# Patient Record
Sex: FEMALE | Race: ASIAN | NOT HISPANIC OR LATINO
[De-identification: names, ages, dates, MRNs, and addresses within clinical notes are randomized per-mention and may not be internally consistent; named-entity substitution may affect disease eponyms.]

---

## 2020-01-21 ENCOUNTER — APPOINTMENT (OUTPATIENT)
Dept: OBGYN | Facility: CLINIC | Age: 30
End: 2020-01-21

## 2020-01-24 ENCOUNTER — TRANSCRIPTION ENCOUNTER (OUTPATIENT)
Age: 30
End: 2020-01-24

## 2020-02-20 ENCOUNTER — APPOINTMENT (OUTPATIENT)
Dept: ANTEPARTUM | Facility: CLINIC | Age: 30
End: 2020-02-20
Payer: COMMERCIAL

## 2020-02-20 PROCEDURE — 76811 OB US DETAILED SNGL FETUS: CPT

## 2020-02-20 PROCEDURE — 76817 TRANSVAGINAL US OBSTETRIC: CPT

## 2020-02-27 ENCOUNTER — APPOINTMENT (OUTPATIENT)
Dept: ANTEPARTUM | Facility: CLINIC | Age: 30
End: 2020-02-27
Payer: COMMERCIAL

## 2020-02-27 PROCEDURE — 76817 TRANSVAGINAL US OBSTETRIC: CPT

## 2020-02-27 PROCEDURE — 76815 OB US LIMITED FETUS(S): CPT

## 2020-03-05 ENCOUNTER — APPOINTMENT (OUTPATIENT)
Dept: ANTEPARTUM | Facility: CLINIC | Age: 30
End: 2020-03-05
Payer: COMMERCIAL

## 2020-03-05 PROCEDURE — 76811 OB US DETAILED SNGL FETUS: CPT

## 2020-03-05 PROCEDURE — 76817 TRANSVAGINAL US OBSTETRIC: CPT

## 2020-03-19 ENCOUNTER — APPOINTMENT (OUTPATIENT)
Dept: ANTEPARTUM | Facility: CLINIC | Age: 30
End: 2020-03-19
Payer: COMMERCIAL

## 2020-03-19 PROCEDURE — 0502F SUBSEQUENT PRENATAL CARE: CPT

## 2020-03-25 PROBLEM — Z00.00 ENCOUNTER FOR PREVENTIVE HEALTH EXAMINATION: Status: ACTIVE | Noted: 2020-03-25

## 2020-04-02 ENCOUNTER — APPOINTMENT (OUTPATIENT)
Dept: ANTEPARTUM | Facility: CLINIC | Age: 30
End: 2020-04-02
Payer: COMMERCIAL

## 2020-04-02 PROCEDURE — 76817 TRANSVAGINAL US OBSTETRIC: CPT

## 2020-04-30 ENCOUNTER — APPOINTMENT (OUTPATIENT)
Dept: ANTEPARTUM | Facility: CLINIC | Age: 30
End: 2020-04-30
Payer: COMMERCIAL

## 2020-04-30 PROCEDURE — 76816 OB US FOLLOW-UP PER FETUS: CPT

## 2020-04-30 PROCEDURE — 76819 FETAL BIOPHYS PROFIL W/O NST: CPT

## 2020-05-21 ENCOUNTER — APPOINTMENT (OUTPATIENT)
Dept: ANTEPARTUM | Facility: CLINIC | Age: 30
End: 2020-05-21
Payer: COMMERCIAL

## 2020-05-21 PROCEDURE — 76819 FETAL BIOPHYS PROFIL W/O NST: CPT

## 2020-05-21 PROCEDURE — 76816 OB US FOLLOW-UP PER FETUS: CPT

## 2020-06-04 ENCOUNTER — APPOINTMENT (OUTPATIENT)
Dept: OBGYN | Facility: CLINIC | Age: 30
End: 2020-06-04
Payer: COMMERCIAL

## 2020-06-04 PROCEDURE — 76819 FETAL BIOPHYS PROFIL W/O NST: CPT

## 2020-06-04 PROCEDURE — 76816 OB US FOLLOW-UP PER FETUS: CPT

## 2020-06-11 ENCOUNTER — APPOINTMENT (OUTPATIENT)
Dept: OBGYN | Facility: CLINIC | Age: 30
End: 2020-06-11
Payer: COMMERCIAL

## 2020-06-11 PROCEDURE — 76818 FETAL BIOPHYS PROFILE W/NST: CPT

## 2020-06-18 ENCOUNTER — APPOINTMENT (OUTPATIENT)
Dept: OBGYN | Facility: CLINIC | Age: 30
End: 2020-06-18
Payer: COMMERCIAL

## 2020-06-18 PROCEDURE — 76819 FETAL BIOPHYS PROFIL W/O NST: CPT

## 2020-06-18 PROCEDURE — 76816 OB US FOLLOW-UP PER FETUS: CPT

## 2020-06-27 ENCOUNTER — INPATIENT (INPATIENT)
Facility: HOSPITAL | Age: 30
LOS: 1 days | Discharge: HOME | End: 2020-06-29
Attending: OBSTETRICS & GYNECOLOGY | Admitting: OBSTETRICS & GYNECOLOGY
Payer: COMMERCIAL

## 2020-06-27 ENCOUNTER — APPOINTMENT (OUTPATIENT)
Dept: ANTEPARTUM | Facility: CLINIC | Age: 30
End: 2020-06-27
Payer: COMMERCIAL

## 2020-06-27 VITALS — TEMPERATURE: 99 F | RESPIRATION RATE: 18 BRPM

## 2020-06-27 DIAGNOSIS — O41.00X0 OLIGOHYDRAMNIOS, UNSPECIFIED TRIMESTER, NOT APPLICABLE OR UNSPECIFIED: ICD-10-CM

## 2020-06-27 DIAGNOSIS — Z98.890 OTHER SPECIFIED POSTPROCEDURAL STATES: Chronic | ICD-10-CM

## 2020-06-27 LAB
BASOPHILS # BLD AUTO: 0.02 K/UL — SIGNIFICANT CHANGE UP (ref 0–0.2)
BASOPHILS NFR BLD AUTO: 0.2 % — SIGNIFICANT CHANGE UP (ref 0–2)
BLD GP AB SCN SERPL QL: NEGATIVE — SIGNIFICANT CHANGE UP
EOSINOPHIL # BLD AUTO: 0.15 K/UL — SIGNIFICANT CHANGE UP (ref 0–0.5)
EOSINOPHIL NFR BLD AUTO: 1.5 % — SIGNIFICANT CHANGE UP (ref 0–6)
GLUCOSE BLDC GLUCOMTR-MCNC: 119 MG/DL — HIGH (ref 70–99)
GLUCOSE BLDC GLUCOMTR-MCNC: 78 MG/DL — SIGNIFICANT CHANGE UP (ref 70–99)
GLUCOSE BLDC GLUCOMTR-MCNC: 79 MG/DL — SIGNIFICANT CHANGE UP (ref 70–99)
HBV SURFACE AG SER-ACNC: NEGATIVE — SIGNIFICANT CHANGE UP
HCT VFR BLD CALC: 38 % — SIGNIFICANT CHANGE UP (ref 34.5–45)
HGB BLD-MCNC: 12.7 G/DL — SIGNIFICANT CHANGE UP (ref 11.5–15.5)
HIV COMBO RESULT: SIGNIFICANT CHANGE UP
HIV1+2 AB SPEC QL: SIGNIFICANT CHANGE UP
IMM GRANULOCYTES NFR BLD AUTO: 0.3 % — SIGNIFICANT CHANGE UP (ref 0–1.5)
LYMPHOCYTES # BLD AUTO: 1.76 K/UL — SIGNIFICANT CHANGE UP (ref 1–3.3)
LYMPHOCYTES # BLD AUTO: 17.7 % — SIGNIFICANT CHANGE UP (ref 13–44)
MCHC RBC-ENTMCNC: 29.5 PG — SIGNIFICANT CHANGE UP (ref 27–34)
MCHC RBC-ENTMCNC: 33.4 % — SIGNIFICANT CHANGE UP (ref 32–36)
MCV RBC AUTO: 88.2 FL — SIGNIFICANT CHANGE UP (ref 80–100)
MONOCYTES # BLD AUTO: 0.56 K/UL — SIGNIFICANT CHANGE UP (ref 0–0.9)
MONOCYTES NFR BLD AUTO: 5.6 % — SIGNIFICANT CHANGE UP (ref 2–14)
NEUTROPHILS # BLD AUTO: 7.42 K/UL — HIGH (ref 1.8–7.4)
NEUTROPHILS NFR BLD AUTO: 74.7 % — SIGNIFICANT CHANGE UP (ref 43–77)
NRBC # FLD: 0 K/UL — SIGNIFICANT CHANGE UP (ref 0–0)
PLATELET # BLD AUTO: 186 K/UL — SIGNIFICANT CHANGE UP (ref 150–400)
PMV BLD: 10.3 FL — SIGNIFICANT CHANGE UP (ref 7–13)
RBC # BLD: 4.31 M/UL — SIGNIFICANT CHANGE UP (ref 3.8–5.2)
RBC # FLD: 14 % — SIGNIFICANT CHANGE UP (ref 10.3–14.5)
RH IG SCN BLD-IMP: POSITIVE — SIGNIFICANT CHANGE UP
RH IG SCN BLD-IMP: POSITIVE — SIGNIFICANT CHANGE UP
WBC # BLD: 9.94 K/UL — SIGNIFICANT CHANGE UP (ref 3.8–10.5)
WBC # FLD AUTO: 9.94 K/UL — SIGNIFICANT CHANGE UP (ref 3.8–10.5)

## 2020-06-27 PROCEDURE — 59025 FETAL NON-STRESS TEST: CPT

## 2020-06-27 RX ORDER — SODIUM CHLORIDE 9 MG/ML
1000 INJECTION, SOLUTION INTRAVENOUS
Refills: 0 | Status: DISCONTINUED | OUTPATIENT
Start: 2020-06-27 | End: 2020-06-27

## 2020-06-27 RX ORDER — SODIUM CHLORIDE 9 MG/ML
1000 INJECTION, SOLUTION INTRAVENOUS
Refills: 0 | Status: DISCONTINUED | OUTPATIENT
Start: 2020-06-27 | End: 2020-06-28

## 2020-06-27 RX ORDER — OXYTOCIN 10 UNIT/ML
VIAL (ML) INJECTION
Qty: 20 | Refills: 0 | Status: DISCONTINUED | OUTPATIENT
Start: 2020-06-27 | End: 2020-06-28

## 2020-06-27 RX ORDER — MORPHINE SULFATE 50 MG/1
4 CAPSULE, EXTENDED RELEASE ORAL ONCE
Refills: 0 | Status: DISCONTINUED | OUTPATIENT
Start: 2020-06-27 | End: 2020-06-27

## 2020-06-27 RX ORDER — OXYTOCIN 10 UNIT/ML
2 VIAL (ML) INJECTION
Qty: 30 | Refills: 0 | Status: DISCONTINUED | OUTPATIENT
Start: 2020-06-27 | End: 2020-06-28

## 2020-06-27 RX ORDER — AMPICILLIN TRIHYDRATE 250 MG
2 CAPSULE ORAL ONCE
Refills: 0 | Status: COMPLETED | OUTPATIENT
Start: 2020-06-27 | End: 2020-06-27

## 2020-06-27 RX ORDER — AMPICILLIN TRIHYDRATE 250 MG
1 CAPSULE ORAL EVERY 4 HOURS
Refills: 0 | Status: DISCONTINUED | OUTPATIENT
Start: 2020-06-27 | End: 2020-06-28

## 2020-06-27 RX ADMIN — SODIUM CHLORIDE 125 MILLILITER(S): 9 INJECTION, SOLUTION INTRAVENOUS at 15:37

## 2020-06-27 RX ADMIN — Medication 216 GRAM(S): at 14:24

## 2020-06-27 RX ADMIN — MORPHINE SULFATE 4 MILLIGRAM(S): 50 CAPSULE, EXTENDED RELEASE ORAL at 22:42

## 2020-06-27 RX ADMIN — Medication 108 GRAM(S): at 18:40

## 2020-06-27 RX ADMIN — Medication 2 MILLIUNIT(S)/MIN: at 19:55

## 2020-06-27 RX ADMIN — SODIUM CHLORIDE 125 MILLILITER(S): 9 INJECTION, SOLUTION INTRAVENOUS at 14:23

## 2020-06-27 RX ADMIN — Medication 108 GRAM(S): at 22:38

## 2020-06-27 NOTE — CHART NOTE - NSCHARTNOTEFT_GEN_A_CORE
PGY3 Progress Note    Subjective  Pt reexamined. SVE as below. CB placed without complication. Pt is comfortable. Pt denies any other concerns.    Objective  T(C): 36.9 (06-27-20 @ 15:30), Max: 37.0 (06-27-20 @ 13:01)  HR: 90 (06-27-20 @ 19:34) (72 - 90)  BP: 107/76 (06-27-20 @ 17:48) (107/76 - 121/76)  RR: 15 (06-27-20 @ 15:11) (15 - 18)  SpO2: 98% (06-27-20 @ 19:34) (91% - 99%)  SVE: 3/70/-3  FHT: baseline 145, mod variability, +accels, -decels  [place]: irreg    Assessment  induction of labor for oligo    Plan  start Pitocin for augmentation of labor  epi PRN      Discussed with attending physician, Dr. Dacosta.    Cyndi Mar MD PGY3

## 2020-06-27 NOTE — OB PROVIDER TRIAGE NOTE - HISTORY OF PRESENT ILLNESS
EDC 2020  Dr Astorga    Patient is a 28y/o  @ 38 4/7wks gestation who was sent to triage, for IOL 2' oligohydramnios.  Patient reports mild, intermittent lower back pain/abdominal cramping.  Denies lOF.  Reports good fetal movements.    AP Course:  GDMA2; on Metformin  Meds - pnv & metformin 500mg bid  NKDA

## 2020-06-27 NOTE — OB PROVIDER TRIAGE NOTE - NSOBPROVIDERNOTE_OBGYN_ALL_OB_FT
EDC 2020  Dr Astorga    Patient is a 30y/o  @ 38 4/7wks gestation who was sent to triage, for IOL 2' oligohydramnios.  Patient reports mild, intermittent lower back pain/abdominal cramping.  Denies lOF.  Reports good fetal movements.    AP Course:  GDMA2; on Metformin  Meds - pnv & metformin 500mg bid  NKDA    PMH/PSH/SH - denies  OB - present    Vital Signs   T(C): 37 (2020 13:05), Max: 37.0 (2020 13:01)  T(F): 98.6 (2020 13:05), Max: 98.6 (2020 13:01)  HR: 85 (2020 13:05) (85 - 85)  BP: 120/82 (2020 13:05) (120/82 - 120/82)  RR: 16 (2020 13:05) (16 - 18)    Abdomen gravid, soft and nontender  Continue EFM  SVE - 2.5/60/-3 Intact  Cephalic presentation  GBS - positive; for ampicillin  Clinical EFW -3100G    Discussed patient with Dr Dacosta  PLan:  admit to L&D for management.  For IOL with po cytotec

## 2020-06-27 NOTE — OB PROVIDER TRIAGE NOTE - NSHPPHYSICALEXAM_GEN_ALL_CORE
Vital Signs   T(C): 37 (27 Jun 2020 13:05), Max: 37.0 (27 Jun 2020 13:01)  T(F): 98.6 (27 Jun 2020 13:05), Max: 98.6 (27 Jun 2020 13:01)  HR: 85 (27 Jun 2020 13:05) (85 - 85)  BP: 120/82 (27 Jun 2020 13:05) (120/82 - 120/82)  RR: 16 (27 Jun 2020 13:05) (16 - 18)    Abdomen gravid, soft and nontender  Continue EFM  SVE - 2.5/60/-3 Intact  Cephalic presentation  GBS - positive; for ampicillin  Clinical EFW -3100G

## 2020-06-27 NOTE — OB PROVIDER H&P - ASSESSMENT
Patient is a 30y/o  @ 38 4/7wks gestation with oligohydramnios.  GDMA2; on metformin 500mg bid  GBS - positive; for ampicillin  For IOL with po Cytotec

## 2020-06-27 NOTE — OB PROVIDER H&P - HISTORY OF PRESENT ILLNESS
EDC 2020  Dr Astorga    Patient is a 30y/o  @ 38 4/7wks gestation who was sent to triage, for IOL 2' oligohydramnios.  Patient reports mild, intermittent lower back pain/abdominal cramping.  Denies lOF.  Reports good fetal movements.    AP Course:  GDMA2; on Metformin  Meds - pnv & metformin 500mg bid  NKDA

## 2020-06-28 ENCOUNTER — TRANSCRIPTION ENCOUNTER (OUTPATIENT)
Age: 30
End: 2020-06-28

## 2020-06-28 LAB
GLUCOSE BLDC GLUCOMTR-MCNC: 104 MG/DL — HIGH (ref 70–99)
GLUCOSE BLDC GLUCOMTR-MCNC: 109 MG/DL — HIGH (ref 70–99)
GLUCOSE BLDC GLUCOMTR-MCNC: 119 MG/DL — HIGH (ref 70–99)
RUBV IGG SER-ACNC: 6.9 INDEX — SIGNIFICANT CHANGE UP
RUBV IGG SER-IMP: POSITIVE — SIGNIFICANT CHANGE UP
SARS-COV-2 RNA SPEC QL NAA+PROBE: SIGNIFICANT CHANGE UP
T PALLIDUM AB TITR SER: NEGATIVE — SIGNIFICANT CHANGE UP

## 2020-06-28 PROCEDURE — 59400 OBSTETRICAL CARE: CPT | Mod: U7

## 2020-06-28 RX ORDER — SIMETHICONE 80 MG/1
80 TABLET, CHEWABLE ORAL EVERY 4 HOURS
Refills: 0 | Status: DISCONTINUED | OUTPATIENT
Start: 2020-06-28 | End: 2020-06-29

## 2020-06-28 RX ORDER — ACETAMINOPHEN 500 MG
975 TABLET ORAL
Refills: 0 | Status: DISCONTINUED | OUTPATIENT
Start: 2020-06-28 | End: 2020-06-29

## 2020-06-28 RX ORDER — DIBUCAINE 1 %
1 OINTMENT (GRAM) RECTAL EVERY 6 HOURS
Refills: 0 | Status: DISCONTINUED | OUTPATIENT
Start: 2020-06-28 | End: 2020-06-29

## 2020-06-28 RX ORDER — TETANUS TOXOID, REDUCED DIPHTHERIA TOXOID AND ACELLULAR PERTUSSIS VACCINE, ADSORBED 5; 2.5; 8; 8; 2.5 [IU]/.5ML; [IU]/.5ML; UG/.5ML; UG/.5ML; UG/.5ML
0.5 SUSPENSION INTRAMUSCULAR ONCE
Refills: 0 | Status: DISCONTINUED | OUTPATIENT
Start: 2020-06-28 | End: 2020-06-29

## 2020-06-28 RX ORDER — SODIUM CHLORIDE 9 MG/ML
3 INJECTION INTRAMUSCULAR; INTRAVENOUS; SUBCUTANEOUS EVERY 8 HOURS
Refills: 0 | Status: DISCONTINUED | OUTPATIENT
Start: 2020-06-28 | End: 2020-06-29

## 2020-06-28 RX ORDER — PRAMOXINE HYDROCHLORIDE 150 MG/15G
1 AEROSOL, FOAM RECTAL EVERY 4 HOURS
Refills: 0 | Status: DISCONTINUED | OUTPATIENT
Start: 2020-06-28 | End: 2020-06-29

## 2020-06-28 RX ORDER — AER TRAVELER 0.5 G/1
1 SOLUTION RECTAL; TOPICAL EVERY 4 HOURS
Refills: 0 | Status: DISCONTINUED | OUTPATIENT
Start: 2020-06-28 | End: 2020-06-29

## 2020-06-28 RX ORDER — OXYCODONE HYDROCHLORIDE 5 MG/1
5 TABLET ORAL ONCE
Refills: 0 | Status: DISCONTINUED | OUTPATIENT
Start: 2020-06-28 | End: 2020-06-29

## 2020-06-28 RX ORDER — IBUPROFEN 200 MG
600 TABLET ORAL EVERY 6 HOURS
Refills: 0 | Status: DISCONTINUED | OUTPATIENT
Start: 2020-06-28 | End: 2020-06-29

## 2020-06-28 RX ORDER — OXYCODONE HYDROCHLORIDE 5 MG/1
5 TABLET ORAL
Refills: 0 | Status: DISCONTINUED | OUTPATIENT
Start: 2020-06-28 | End: 2020-06-29

## 2020-06-28 RX ORDER — SODIUM CHLORIDE 9 MG/ML
500 INJECTION, SOLUTION INTRAVENOUS ONCE
Refills: 0 | Status: DISCONTINUED | OUTPATIENT
Start: 2020-06-28 | End: 2020-06-29

## 2020-06-28 RX ORDER — MAGNESIUM HYDROXIDE 400 MG/1
30 TABLET, CHEWABLE ORAL
Refills: 0 | Status: DISCONTINUED | OUTPATIENT
Start: 2020-06-28 | End: 2020-06-29

## 2020-06-28 RX ORDER — IBUPROFEN 200 MG
600 TABLET ORAL EVERY 6 HOURS
Refills: 0 | Status: COMPLETED | OUTPATIENT
Start: 2020-06-28 | End: 2021-05-27

## 2020-06-28 RX ORDER — OXYTOCIN 10 UNIT/ML
6 VIAL (ML) INJECTION
Qty: 30 | Refills: 0 | Status: DISCONTINUED | OUTPATIENT
Start: 2020-06-28 | End: 2020-06-29

## 2020-06-28 RX ORDER — BENZOCAINE 10 %
1 GEL (GRAM) MUCOUS MEMBRANE EVERY 6 HOURS
Refills: 0 | Status: DISCONTINUED | OUTPATIENT
Start: 2020-06-28 | End: 2020-06-29

## 2020-06-28 RX ORDER — KETOROLAC TROMETHAMINE 30 MG/ML
30 SYRINGE (ML) INJECTION ONCE
Refills: 0 | Status: DISCONTINUED | OUTPATIENT
Start: 2020-06-28 | End: 2020-06-28

## 2020-06-28 RX ORDER — DIPHENHYDRAMINE HCL 50 MG
25 CAPSULE ORAL EVERY 6 HOURS
Refills: 0 | Status: DISCONTINUED | OUTPATIENT
Start: 2020-06-28 | End: 2020-06-29

## 2020-06-28 RX ORDER — HYDROCORTISONE 1 %
1 OINTMENT (GRAM) TOPICAL EVERY 6 HOURS
Refills: 0 | Status: DISCONTINUED | OUTPATIENT
Start: 2020-06-28 | End: 2020-06-29

## 2020-06-28 RX ORDER — SODIUM CHLORIDE 9 MG/ML
500 INJECTION INTRAMUSCULAR; INTRAVENOUS; SUBCUTANEOUS ONCE
Refills: 0 | Status: COMPLETED | OUTPATIENT
Start: 2020-06-28 | End: 2020-06-28

## 2020-06-28 RX ORDER — OXYTOCIN 10 UNIT/ML
333.33 VIAL (ML) INJECTION
Qty: 20 | Refills: 0 | Status: DISCONTINUED | OUTPATIENT
Start: 2020-06-28 | End: 2020-06-29

## 2020-06-28 RX ORDER — LANOLIN
1 OINTMENT (GRAM) TOPICAL EVERY 6 HOURS
Refills: 0 | Status: DISCONTINUED | OUTPATIENT
Start: 2020-06-28 | End: 2020-06-29

## 2020-06-28 RX ORDER — SODIUM CHLORIDE 9 MG/ML
1000 INJECTION INTRAMUSCULAR; INTRAVENOUS; SUBCUTANEOUS
Refills: 0 | Status: DISCONTINUED | OUTPATIENT
Start: 2020-06-28 | End: 2020-06-28

## 2020-06-28 RX ADMIN — Medication 6 MILLIUNIT(S)/MIN: at 04:26

## 2020-06-28 RX ADMIN — SODIUM CHLORIDE 3 MILLILITER(S): 9 INJECTION INTRAMUSCULAR; INTRAVENOUS; SUBCUTANEOUS at 14:53

## 2020-06-28 RX ADMIN — Medication 108 GRAM(S): at 06:28

## 2020-06-28 RX ADMIN — SODIUM CHLORIDE 1000 MILLILITER(S): 9 INJECTION INTRAMUSCULAR; INTRAVENOUS; SUBCUTANEOUS at 06:00

## 2020-06-28 RX ADMIN — Medication 108 GRAM(S): at 02:46

## 2020-06-28 RX ADMIN — SODIUM CHLORIDE 3 MILLILITER(S): 9 INJECTION INTRAMUSCULAR; INTRAVENOUS; SUBCUTANEOUS at 21:43

## 2020-06-28 RX ADMIN — Medication 1000 MILLIUNIT(S)/MIN: at 08:39

## 2020-06-28 RX ADMIN — Medication 30 MILLIGRAM(S): at 08:39

## 2020-06-28 RX ADMIN — Medication 600 MILLIGRAM(S): at 15:50

## 2020-06-28 RX ADMIN — Medication 975 MILLIGRAM(S): at 21:44

## 2020-06-28 NOTE — DISCHARGE NOTE OB - CARE PLAN
Principal Discharge DX:	Vacuum extractor delivery  Goal:	recovery  Assessment and plan of treatment:	pelvic rest, regular diet  Secondary Diagnosis:	Gestational diabetes mellitus (GDM) in third trimester, gestational diabetes method of control unspecified

## 2020-06-28 NOTE — OB NEONATOLOGY/PEDIATRICIAN DELIVERY SUMMARY - NSPEDSNEONOTESA_OBGYN_ALL_OB_FT
Called to delivery by Dr. Dacosta for NFRHT and vacuum assisted delivery. 38.4 week male born to a 31 year old , AB+, GBS unknown (treated with ampx2), PNL unremarkable mother. COVID negative. No significant medical/surgical history. Pregnancy significant for GDMA2 on metformin. Mother admitted in labor. AROM 2020 @ 0246 with clear fluids. Terminal meconium and NRFHT noted. Male infant born via vacuum assisted VD with spontaneous cry. W/D/S/S. AGPARs  at 1 and 5 minutes respectively. To WBN for routine care. EOS: 0.05.

## 2020-06-28 NOTE — DISCHARGE NOTE OB - PATIENT PORTAL LINK FT
You can access the FollowMyHealth Patient Portal offered by [facility] by registering at the following website: [email or web address]. By joining [employer]’s FollowMyHealth portal, you will also be able to view your health information using other applications (apps) compatible with our system.

## 2020-06-28 NOTE — DISCHARGE NOTE OB - BREAST MILK PROVIDES PROTECTION AGAINST INFECTION
"Chief Complaint   Patient presents with     Weight Check       Initial Pulse 154  Temp 97.6  F (36.4  C) (Temporal)  Ht 1' 8.5\" (0.521 m)  Wt 6 lb 8.8 oz (2.971 kg)  HC 13.58\" (34.5 cm)  SpO2 99%  BMI 10.96 kg/m2 Estimated body mass index is 10.96 kg/(m^2) as calculated from the following:    Height as of this encounter: 1' 8.5\" (0.521 m).    Weight as of this encounter: 6 lb 8.8 oz (2.971 kg).  Medication Reconciliation: complete       Luz Marina Osorio, [place]    " Statement Selected

## 2020-06-28 NOTE — CHART NOTE - NSCHARTNOTEFT_GEN_A_CORE
Minimal variability and recurrent decels  4-5 cm  Pitocin off  Resuscitation  Discussed  section if no progression  LEO Dacosta

## 2020-06-28 NOTE — OB PROVIDER DELIVERY SUMMARY - NSPROVIDERDELIVERYNOTE_OBGYN_ALL_OB_FT
Vacuum applied after verbal consent given for fetal heart tracing. Vacuum applied after verbal consent given for fetal heart tracing. Vacuum assisted delivery of liveborn infant from OP position. Head, shoulders, and body delivered easily. Infant was suctioned. No mec. Cord was clamped and cut and infant was passed to peds. Placenta delivered intact with a 3 vessel cord. Fundal massage was given and uterine fundus was found to be firm. Vaginal exam revealed an intact cervix, vaginal walls and sulci. LML was repaired with 2-0 chromic and 0 vicryl suture. Excellent hemostasis was noted. Patient was stable. Count was correct x 2.    Alejandra Scott PGY-3

## 2020-06-28 NOTE — CHART NOTE - NSCHARTNOTEFT_GEN_A_CORE
PGY3 Progress Note    Subjective  Pt reexamined. SVE as below. AROM clear. Pt is comfortable. Pt denies any other concerns.    Objective  T(C): 36.9 (06-28-20 @ 02:23), Max: 37.0 (06-27-20 @ 13:01)  HR: 70 (06-28-20 @ 03:13) (67 - 90)  BP: 106/66 (06-28-20 @ 01:48) (106/59 - 134/77)  RR: 15 (06-27-20 @ 15:11) (15 - 18)  SpO2: 100% (06-28-20 @ 03:13) (91% - 100%)  SVE: 4/60/-3  FHT: baseline 140, mod variability, +accels, -decels  [place]: q2-3min    Assessment  induction of labor for oligo    Plan  continue Pitocin for induction  epi PRN    Discussed with attending physician, Dr. Dacosta.    Cyndi Mar MD PGY3

## 2020-06-28 NOTE — CHART NOTE - NSCHARTNOTEFT_GEN_A_CORE
R2 Note    S: Called to evaluate patient after Orthostatics showed jump in HR from 90-140s. Pt is PPD0 from VAVD,  with LML. The patient denies symptoms of lightheadedness, dizziness, chest pain, palpitations, SOB, syncope, heavy vaginal bleeding. Pain well controlled. She is tolerating PO and is drinking fluids. She has ambulated to the bathroom without symptoms.     O:  Vital Signs Last 24 Hrs  T(C): 36.7 (28 Jun 2020 08:16), Max: 37.1 (28 Jun 2020 06:49)  T(F): 98.1 (28 Jun 2020 08:16), Max: 98.78 (28 Jun 2020 06:49)  HR: 85 (28 Jun 2020 11:18) (65 - 131)  BP: 115/74 (28 Jun 2020 09:17) (104/57 - 173/64)  BP(mean): --  RR: 15 (27 Jun 2020 15:11) (15 - 18)  SpO2: 98% (28 Jun 2020 11:18) (86% - 100%)    PE:  Gen: in NAD  CV: regular rate, HR  while in the room  Chest: CTAB  Abd: soft, nontender, mildly distended  Vaginal exam: mild bleeding with fundal pressure; LML palpated and feels in tact with no evidence of hematoma formation.   LE: no erythema/edema    A/P: pt PPD0 s/p VAVD with LML, . Patient now with mild tachycardia, however continues to be completely asymptomatic both lying and standing; patient likely with fluid deficient 2/2 traumatic delivery with LML   -500cc bolus now  -Reeval vitals and orthostatics after IVF    D/w PPeralta PGY3  RFrankel PGY2

## 2020-06-28 NOTE — CHART NOTE - NSCHARTNOTEFT_GEN_A_CORE
FHT with indeterminant baseline and recurrent variable decels.  Went to room RN in room pushing with pt.  I advised RN to stop pushing to establish a baseline and then once baseline established to push with every other contraction.  Dr. Dacosta is present in house.  Will continue to monitor FHT closely.      Caryl Calix MD

## 2020-06-28 NOTE — CHART NOTE - NSCHARTNOTEFT_GEN_A_CORE
R2 Progress Note    Patient seen and examined at bedside for placement of IUPC for amnioinfusion after AROM,     NAD  Vital Signs Last 24 Hrs  T(C): 36.9 (2020 02:23), Max: 37.0 (2020 13:01)  T(F): 98.42 (2020 02:23), Max: 98.6 (2020 13:01)  HR: 77 (2020 02:38) (69 - 90)  BP: 106/66 (2020 01:48) (106/59 - 134/77)  BP(mean): --  RR: 15 (2020 15:11) (15 - 18)  SpO2: 100% (2020 02:38) (91% - 100%)    SVE:4-5/80/-2  EFM:140 , mod jonny, +accel, -decel  TOCO:q2 min    Assesment: 28 y/o  @ 38/4 induction of labor for oligohydramnios now with variable decels after AROM.    Plan   -For amnioinfusion    d/w Dr. Praneeth Norris PGY2

## 2020-06-28 NOTE — DISCHARGE NOTE OB - MEDICATION SUMMARY - MEDICATIONS TO TAKE
I will START or STAY ON the medications listed below when I get home from the hospital:    ibuprofen 600 mg oral tablet  -- 1 tab(s) by mouth every 6 hours, As Needed  -- Indication: For pain    acetaminophen 325 mg oral tablet  -- 3 tab(s) by mouth , As Needed  -- Indication: For pain    Prenatal Multivitamins with Folic Acid 1 mg oral tablet  -- 1 tab(s) by mouth once a day  -- Indication: For Vitamins

## 2020-06-28 NOTE — OB RN DELIVERY SUMMARY - NS_DELIVERYROOM_OBGYN_ALL_OB_FT
Patient has gallon of audrey in room.  States having colonoscopy after cardic cath but on coumadin.  D/w nurse, audrey is old and brought from home.  Her gi workup can be outpatient.  Await cardiac input ? need for psych evaluation.  continue current gi regimen.  No plan for colonosocpy tomorrow for screening. LDR6

## 2020-06-28 NOTE — DISCHARGE NOTE OB - CARE PROVIDER_API CALL
Don Astorga  MATERNAL/FETAL MEDICINE  [address] [address] [place] [address]  Phone: [phone]  Fax: [phone]  Follow Up Time:

## 2020-06-28 NOTE — LACTATION INITIAL EVALUATION - LACTATION INTERVENTIONS
initiate skin to skin/assisted to put baby to rt. breast in football hold position with deep latch and baby noted to suck with stimulation;  pt. is awkward with hand placement and required reminders as to how to place hands

## 2020-06-28 NOTE — CHART NOTE - NSCHARTNOTEFT_GEN_A_CORE
S:    Patient seen and examined for cat 2 tracing     O:   T(C): 36.9 (04:45)  HR: 83 (05:58)  BP: 125/81 (05:58)  RR: 15 (15:11)  SpO2: 98% (05:59)  SVE: 10/100/0  EFM: cat 2 - difficult to determine baseline due to discontinuity  [place]: q1-2min          A/P:   -ISE placed due  -resuscitate with IVF bolus, lateral repositioning, O2   -pause pushing until baseline established and c/w resuscitation at this time       d/w Dr. Praneeth Haile PGY4

## 2020-06-28 NOTE — OB RN DELIVERY SUMMARY - NS_LABORCHARACTER_OBGYN_ALL_OB
Induction of labor-Medicinal/Induction of labor-AROM/External electronic FM/Antibiotics in labor/Internal electronic FM/Meconium staining

## 2020-06-29 VITALS
OXYGEN SATURATION: 100 % | TEMPERATURE: 98 F | HEART RATE: 86 BPM | RESPIRATION RATE: 18 BRPM | DIASTOLIC BLOOD PRESSURE: 78 MMHG | SYSTOLIC BLOOD PRESSURE: 112 MMHG

## 2020-06-29 RX ORDER — METFORMIN HYDROCHLORIDE 850 MG/1
1 TABLET ORAL
Qty: 0 | Refills: 0 | DISCHARGE

## 2020-06-29 RX ORDER — ACETAMINOPHEN 500 MG
3 TABLET ORAL
Qty: 0 | Refills: 0 | DISCHARGE
Start: 2020-06-29

## 2020-06-29 RX ORDER — IBUPROFEN 200 MG
1 TABLET ORAL
Qty: 0 | Refills: 0 | DISCHARGE
Start: 2020-06-29

## 2020-06-29 RX ADMIN — OXYCODONE HYDROCHLORIDE 5 MILLIGRAM(S): 5 TABLET ORAL at 06:04

## 2020-06-29 RX ADMIN — SODIUM CHLORIDE 3 MILLILITER(S): 9 INJECTION INTRAMUSCULAR; INTRAVENOUS; SUBCUTANEOUS at 06:06

## 2020-06-29 RX ADMIN — Medication 600 MILLIGRAM(S): at 02:00

## 2020-06-29 RX ADMIN — Medication 600 MILLIGRAM(S): at 14:33

## 2020-06-29 RX ADMIN — Medication 975 MILLIGRAM(S): at 06:04

## 2020-06-29 RX ADMIN — Medication 1 TABLET(S): at 14:34

## 2020-06-29 NOTE — PROGRESS NOTE ADULT - SUBJECTIVE AND OBJECTIVE BOX
Patient assessed at 0730.  Subjective  Pain: Patient denies any pain at the time of assessment. Pain being managed well by pain management protocol. Patient requesting to be discharge to home today.   Complaints:  None. Patient denies any headache, blur vision and/or dizziness.   Infant feeding:    breastfeeding                             Feeding related issues or concerns: slightly flat nipples but states infant latches on and has been seen by lactation.       Vitals  T(C): 36.6 (20 @ 06:00), Max: 36.9 (20 @ 13:59)  HR: 86 (20 @ 06:00) (76 - 131)  BP: 112/78 (20 @ 06:00) (104/57 - 173/64)  RR: 18 (20 @ 06:00) (16 - 18)  SpO2: 100% (20 @ 06:00) (92% - 100%)  Wt(kg): --        LABS:                          12.7   9.94  )-----------( 186      ( 2020 13:35 )             38.0       Blood Type: AB Positive      Treponema Pallidum Antibody Interpretation: Negative ( @ 13:35)      Rubella IgG Interp: Positive ( @ 16:30)      COVID-19 negative         MEDICATIONS  (STANDING):  acetaminophen   Tablet .. 975 milliGRAM(s) Oral <User Schedule>  diphtheria/tetanus/pertussis (acellular) Vaccine (ADAcel) 0.5 milliLiter(s) IntraMuscular once  ibuprofen  Tablet. 600 milliGRAM(s) Oral every 6 hours  lactated ringers Bolus 500 milliLiter(s) IV Bolus once  prenatal multivitamin 1 Tablet(s) Oral daily  sodium chloride 0.9% lock flush 3 milliLiter(s) IV Push every 8 hours    MEDICATIONS  (PRN):  benzocaine 20%/menthol 0.5% Spray 1 Spray(s) Topical every 6 hours PRN for Perineal discomfort  dibucaine 1% Ointment 1 Application(s) Topical every 6 hours PRN Perineal discomfort  diphenhydrAMINE 25 milliGRAM(s) Oral every 6 hours PRN Pruritus  hydrocortisone 1% Cream 1 Application(s) Topical every 6 hours PRN Moderate Pain (4-6)  lanolin Ointment 1 Application(s) Topical every 6 hours PRN nipple soreness  magnesium hydroxide Suspension 30 milliLiter(s) Oral two times a day PRN Constipation  oxyCODONE    IR 5 milliGRAM(s) Oral every 3 hours PRN Moderate to Severe Pain (4-10)  oxyCODONE    IR 5 milliGRAM(s) Oral once PRN Moderate to Severe Pain (4-10)  pramoxine 1%/zinc 5% Cream 1 Application(s) Topical every 4 hours PRN Moderate Pain (4-6)  simethicone 80 milliGRAM(s) Chew every 4 hours PRN Gas  witch hazel Pads 1 Application(s) Topical every 4 hours PRN Perineal discomfort        30y/o . Day #1 @ 0724  postpartum .  Assisted delivery (vacuum, forceps): vacuum  Indication for assisted delivery: abnormal FHR  Past medical/surgical/OB/GYN history significant for gestational diabetes was on metformin, cervical polyp removed 2018  Current Issues: EBL 400cc  Alert and orientedx3. Out of bed ambulating. Voiding. Tolerating a regular diet.  Lung sound clear bilaterally  Breasts: soft, nontender  Nipples: intact, slightly flat  Abdomen: Soft, nontender, nondistended. Fundus firm. Positive bowel sounds  Vagina: Lochia light rubra  Perineum:  no edema and/or erythema noted  Laceration/episiotomy site: left mediolateral episiotomy   Lower extremities: Slight edema noted bilaterally and equal of lower extremities. Nontender calves.  No erythema noted. Positive pedal pulses. No palpable veins noted.  Other relevant physical exam findings: none          PLAN:  Continue routine postpartum care.   Increase ambulation, analgesia PRN and pain medication protocol standing oxycodone, ibuprofen and acetaminophen.  Continue regular diet  Discussed breast/nipple care. breastfeeding and use of breast pump.   Glucose testing needed in 6weeks for history of gestational diabetes.   Discharge to home today. Discussed discharge planning.

## 2020-06-30 ENCOUNTER — TRANSCRIPTION ENCOUNTER (OUTPATIENT)
Age: 30
End: 2020-06-30

## 2020-07-31 PROBLEM — O24.419 GESTATIONAL DIABETES MELLITUS IN PREGNANCY, UNSPECIFIED CONTROL: Chronic | Status: ACTIVE | Noted: 2020-06-27

## 2020-08-10 ENCOUNTER — APPOINTMENT (OUTPATIENT)
Dept: OBGYN | Facility: CLINIC | Age: 30
End: 2020-08-10
Payer: COMMERCIAL

## 2020-08-10 PROCEDURE — 0503F POSTPARTUM CARE VISIT: CPT

## 2024-02-08 NOTE — OB RN DELIVERY SUMMARY - NSSTERILIZETYPE_OBGYN_ALL_OB
[facility] RADIATION ONCOLOGY CONSULTATION    Patient: Yesica Guerra MRN: [id number]  SSN: [id number]    YOB: 1975  Age: 48 y.o.  Sex: female      Other Providers:  Dr. Sutton, Dr. White    CHIEF COMPLAINT: Abnormal screening mammogram     DIAGNOSIS:  Cancer Staging   Malignant neoplasm of female breast (HCC)  Staging form: Breast, AJCC 8th Edition  - Clinical stage from 1/16/2024: Stage IA (cT1b, cN0(sn), cM0, G1, ER+, VT+, HER2-) - Signed by Chava Sutton MD on 1/16/2024       PREVIOUS RADIATION TREATMENT:  None    HISTORY OF PRESENT ILLNESS:  Yesica Guerra is a 48 y.o. female who I am seeing at the request of Dr. Sutton.     Ms. Narvaez was in her usual state of health until screening mammogram 10/7/23 demonstrated a left asymmetry confirmed on diagnostic mammogram and ultrasound 10/23/23. Biopsy 10/27/23 showed fibrous mastopathy with minimal intraductal hyperplasia and apocrine metaplasia. On 12/26/23 she underwent L breast lumpectomy which showed IDC, low grade, 6mm in greatest dimension, and fibrocystic mastopathy. The lesion was ER+ 74% VT+ 74% HER/ nikolai equivocal negative on FISH. Margins were negative (2mm from closest margin). On 1/9/24 she underwent SLNB which was negative for malignancy. Her Oncotype was 17. She denies pain or residual tenderness or swelling. 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 She has a family history of breast cancer in her mother at age 49.    PAST MEDICAL HISTORY:    Past Medical History:   Diagnosis Date    Asthma     as a child- no issues as adult    Breast cancer (HCC)     INVASIVE DUCTAL CARCINOMA, LOW GRADE Left breast    Fatigue 8/5/2014    chronic    Hematuria, microscopic     recurrent uti's, macrobid post-coital    Mitral valve prolapse 1988    Other ill-defined conditions(549.89)     allergies    Prediabetes     Metformin daily    Psychiatric disorder      None

## 2024-10-12 NOTE — OB RN PATIENT PROFILE - BREASTFEEDING MOTHER TAUGHT HOW TO HAND EXPRESS THEIR OWN MILK
Likely multifactorial in etiology (pulm edema, heart failure, possible thrombotic disease, atelectasis).  Supplement O2 to keep >90%  Treat underlying issues per their respective sections.     Statement Selected

## 2025-03-04 ENCOUNTER — APPOINTMENT (OUTPATIENT)
Dept: OTOLARYNGOLOGY | Facility: CLINIC | Age: 35
End: 2025-03-04
Payer: COMMERCIAL

## 2025-03-04 ENCOUNTER — NON-APPOINTMENT (OUTPATIENT)
Age: 35
End: 2025-03-04

## 2025-03-04 VITALS
WEIGHT: 154 LBS | SYSTOLIC BLOOD PRESSURE: 128 MMHG | HEIGHT: 62 IN | BODY MASS INDEX: 28.34 KG/M2 | HEART RATE: 101 BPM | DIASTOLIC BLOOD PRESSURE: 82 MMHG

## 2025-03-04 DIAGNOSIS — H90.0 CONDUCTIVE HEARING LOSS, BILATERAL: ICD-10-CM

## 2025-03-04 DIAGNOSIS — H93.90 UNSPECIFIED DISORDER OF EAR, UNSPECIFIED EAR: ICD-10-CM

## 2025-03-04 DIAGNOSIS — H93.293 OTHER ABNORMAL AUDITORY PERCEPTIONS, BILATERAL: ICD-10-CM

## 2025-03-04 DIAGNOSIS — H72.03 CENTRAL PERFORATION OF TYMPANIC MEMBRANE, BILATERAL: ICD-10-CM

## 2025-03-04 DIAGNOSIS — Z78.9 OTHER SPECIFIED HEALTH STATUS: ICD-10-CM

## 2025-03-04 PROCEDURE — 99204 OFFICE O/P NEW MOD 45 MIN: CPT

## 2025-03-04 PROCEDURE — 92504 EAR MICROSCOPY EXAMINATION: CPT

## 2025-03-04 RX ORDER — CHROMIUM 200 MCG
TABLET ORAL
Refills: 0 | Status: ACTIVE | COMMUNITY

## 2025-05-12 ENCOUNTER — APPOINTMENT (OUTPATIENT)
Dept: OBGYN | Facility: CLINIC | Age: 35
End: 2025-05-12

## 2025-05-12 VITALS — SYSTOLIC BLOOD PRESSURE: 145 MMHG | WEIGHT: 159 LBS | DIASTOLIC BLOOD PRESSURE: 97 MMHG | BODY MASS INDEX: 29.08 KG/M2

## 2025-05-12 VITALS — DIASTOLIC BLOOD PRESSURE: 90 MMHG | SYSTOLIC BLOOD PRESSURE: 127 MMHG

## 2025-05-12 DIAGNOSIS — O16.9 UNSPECIFIED MATERNAL HYPERTENSION, UNSPECIFIED TRIMESTER: ICD-10-CM

## 2025-05-12 DIAGNOSIS — O09.299 SUPERVISION OF PREGNANCY WITH OTHER POOR REPRODUCTIVE OR OBSTETRIC HISTORY, UNSPECIFIED TRIMESTER: ICD-10-CM

## 2025-05-12 DIAGNOSIS — O21.9 VOMITING OF PREGNANCY, UNSPECIFIED: ICD-10-CM

## 2025-05-12 DIAGNOSIS — Z82.49 FAMILY HISTORY OF ISCHEMIC HEART DISEASE AND OTHER DISEASES OF THE CIRCULATORY SYSTEM: ICD-10-CM

## 2025-05-12 DIAGNOSIS — Z86.32 SUPERVISION OF PREGNANCY WITH OTHER POOR REPRODUCTIVE OR OBSTETRIC HISTORY, UNSPECIFIED TRIMESTER: ICD-10-CM

## 2025-05-12 DIAGNOSIS — Z32.00 ENCOUNTER FOR PREGNANCY TEST, RESULT UNKNOWN: ICD-10-CM

## 2025-05-12 DIAGNOSIS — Z01.419 ENCOUNTER FOR GYNECOLOGICAL EXAMINATION (GENERAL) (ROUTINE) W/OUT ABNORMAL FINDINGS: ICD-10-CM

## 2025-05-12 DIAGNOSIS — Z86.32 PERSONAL HISTORY OF GESTATIONAL DIABETES: ICD-10-CM

## 2025-05-12 DIAGNOSIS — O09.529 SUPERVISION OF ELDERLY MULTIGRAVIDA, UNSPECIFIED TRIMESTER: ICD-10-CM

## 2025-05-12 PROCEDURE — 99385 PREV VISIT NEW AGE 18-39: CPT

## 2025-05-12 PROCEDURE — 36415 COLL VENOUS BLD VENIPUNCTURE: CPT

## 2025-05-12 PROCEDURE — 99459 PELVIC EXAMINATION: CPT | Mod: NC

## 2025-05-12 RX ORDER — ONDANSETRON 4 MG/1
4 TABLET ORAL
Qty: 1 | Refills: 0 | Status: ACTIVE | COMMUNITY
Start: 2025-05-12 | End: 1900-01-01

## 2025-05-12 RX ORDER — DOXYLAMINE SUCCINATE AND PYRIDOXINE HYDROCHLORIDE 10; 10 MG/1; MG/1
10-10 TABLET, DELAYED RELEASE ORAL
Qty: 90 | Refills: 3 | Status: ACTIVE | COMMUNITY
Start: 2025-05-12 | End: 1900-01-01

## 2025-05-12 RX ORDER — METFORMIN HYDROCHLORIDE 500 MG/1
500 TABLET, COATED ORAL
Refills: 0 | Status: ACTIVE | COMMUNITY

## 2025-05-15 LAB
APPEARANCE: ABNORMAL
BACTERIA UR CULT: NORMAL
BACTERIA: NEGATIVE /HPF
BASOPHILS # BLD AUTO: 0.05 K/UL
BASOPHILS NFR BLD AUTO: 0.4 %
BILIRUBIN URINE: NEGATIVE
BLOOD URINE: NEGATIVE
C TRACH RRNA SPEC QL NAA+PROBE: NOT DETECTED
CAST: 0 /LPF
COLOR: YELLOW
EOSINOPHIL # BLD AUTO: 0.68 K/UL
EOSINOPHIL NFR BLD AUTO: 4.9 %
EPITHELIAL CELLS: 0 /HPF
ESTIMATED AVERAGE GLUCOSE: 103 MG/DL
GLUCOSE QUALITATIVE U: NEGATIVE MG/DL
GLUCOSE SERPL-MCNC: 82 MG/DL
HBA1C MFR BLD HPLC: 5.2 %
HBV CORE IGG+IGM SER QL: NONREACTIVE
HBV SURFACE AB SER QL: REACTIVE
HBV SURFACE AG SER QL: NONREACTIVE
HCT VFR BLD CALC: 40.4 %
HCV AB SER QL: NONREACTIVE
HCV S/CO RATIO: 0.45 S/CO
HGB BLD-MCNC: 13.3 G/DL
HIV1+2 AB SPEC QL IA.RAPID: NONREACTIVE
IMM GRANULOCYTES NFR BLD AUTO: 0.3 %
KETONES URINE: NEGATIVE MG/DL
LEAD BLD-MCNC: <1 UG/DL
LEUKOCYTE ESTERASE URINE: NEGATIVE
LYMPHOCYTES # BLD AUTO: 2.88 K/UL
LYMPHOCYTES NFR BLD AUTO: 20.9 %
M TB IFN-G BLD-IMP: NEGATIVE
MAN DIFF?: NORMAL
MCHC RBC-ENTMCNC: 29.8 PG
MCHC RBC-ENTMCNC: 32.9 G/DL
MCV RBC AUTO: 90.4 FL
MEV IGG FLD QL IA: >300 AU/ML
MEV IGG+IGM SER-IMP: POSITIVE
MICROSCOPIC-UA: NORMAL
MONOCYTES # BLD AUTO: 0.68 K/UL
MONOCYTES NFR BLD AUTO: 4.9 %
MUV AB SER-ACNC: POSITIVE
MUV IGG SER QL IA: 22.6 AU/ML
N GONORRHOEA RRNA SPEC QL NAA+PROBE: NOT DETECTED
NEUTROPHILS # BLD AUTO: 9.43 K/UL
NEUTROPHILS NFR BLD AUTO: 68.6 %
NITRITE URINE: NEGATIVE
PH URINE: 7.5
PLATELET # BLD AUTO: 257 K/UL
PROTEIN URINE: NEGATIVE MG/DL
QUANTIFERON TB PLUS MITOGEN MINUS NIL: >10 IU/ML
QUANTIFERON TB PLUS NIL: 0.11 IU/ML
QUANTIFERON TB PLUS TB1 MINUS NIL: 0.1 IU/ML
QUANTIFERON TB PLUS TB2 MINUS NIL: 0.06 IU/ML
RBC # BLD: 4.47 M/UL
RBC # FLD: 13.3 %
RED BLOOD CELLS URINE: 1 /HPF
RUBV IGG FLD-ACNC: 8.08 INDEX
RUBV IGG SER-IMP: POSITIVE
SOURCE AMPLIFICATION: NORMAL
SPECIFIC GRAVITY URINE: 1.01
T PALLIDUM AB SER QL IA: NEGATIVE
T4 FREE SERPL-MCNC: 1.2 NG/DL
TSH SERPL-ACNC: 1.47 UIU/ML
UROBILINOGEN URINE: 0.2 MG/DL
WBC # FLD AUTO: 13.76 K/UL
WHITE BLOOD CELLS URINE: 1 /HPF

## 2025-05-16 LAB — CYTOLOGY CVX/VAG DOC THIN PREP: NORMAL

## 2025-06-02 ENCOUNTER — APPOINTMENT (OUTPATIENT)
Dept: OBGYN | Facility: CLINIC | Age: 35
End: 2025-06-02

## 2025-06-02 ENCOUNTER — APPOINTMENT (OUTPATIENT)
Dept: ANTEPARTUM | Facility: CLINIC | Age: 35
End: 2025-06-02
Payer: COMMERCIAL

## 2025-06-02 ENCOUNTER — ASOB RESULT (OUTPATIENT)
Age: 35
End: 2025-06-02

## 2025-06-02 VITALS
DIASTOLIC BLOOD PRESSURE: 92 MMHG | WEIGHT: 163 LBS | HEIGHT: 62 IN | BODY MASS INDEX: 30 KG/M2 | SYSTOLIC BLOOD PRESSURE: 152 MMHG

## 2025-06-02 VITALS — SYSTOLIC BLOOD PRESSURE: 145 MMHG | DIASTOLIC BLOOD PRESSURE: 88 MMHG

## 2025-06-02 PROBLEM — O02.1 MISSED ABORTION: Status: ACTIVE | Noted: 2025-06-02

## 2025-06-02 PROCEDURE — 76817 TRANSVAGINAL US OBSTETRIC: CPT

## 2025-06-02 PROCEDURE — 36415 COLL VENOUS BLD VENIPUNCTURE: CPT

## 2025-06-02 PROCEDURE — 76815 OB US LIMITED FETUS(S): CPT

## 2025-06-02 PROCEDURE — 99213 OFFICE O/P EST LOW 20 MIN: CPT

## 2025-06-04 ENCOUNTER — APPOINTMENT (OUTPATIENT)
Dept: OBGYN | Facility: CLINIC | Age: 35
End: 2025-06-04
Payer: COMMERCIAL

## 2025-06-04 DIAGNOSIS — Z00.00 ENCOUNTER FOR GENERAL ADULT MEDICAL EXAMINATION W/OUT ABNORMAL FINDINGS: ICD-10-CM

## 2025-06-04 PROCEDURE — 36415 COLL VENOUS BLD VENIPUNCTURE: CPT

## 2025-06-08 LAB
HCG SERPL-MCNC: ABNORMAL MIU/ML
HCG SERPL-MCNC: ABNORMAL MIU/ML

## 2025-06-09 ENCOUNTER — APPOINTMENT (OUTPATIENT)
Dept: ANTEPARTUM | Facility: CLINIC | Age: 35
End: 2025-06-09

## 2025-06-09 ENCOUNTER — APPOINTMENT (OUTPATIENT)
Dept: OBGYN | Facility: CLINIC | Age: 35
End: 2025-06-09
Payer: COMMERCIAL

## 2025-06-09 VITALS — WEIGHT: 165 LBS | SYSTOLIC BLOOD PRESSURE: 132 MMHG | BODY MASS INDEX: 30.18 KG/M2 | DIASTOLIC BLOOD PRESSURE: 84 MMHG

## 2025-06-09 PROCEDURE — 76801 OB US < 14 WKS SINGLE FETUS: CPT

## 2025-06-09 PROCEDURE — 36415 COLL VENOUS BLD VENIPUNCTURE: CPT

## 2025-06-09 PROCEDURE — 99213 OFFICE O/P EST LOW 20 MIN: CPT

## 2025-06-11 LAB
ABORH: NORMAL
ANTIBODY SCREEN: NORMAL
BASOPHILS # BLD AUTO: 0.04 K/UL
BASOPHILS NFR BLD AUTO: 0.4 %
EOSINOPHIL # BLD AUTO: 0.72 K/UL
EOSINOPHIL NFR BLD AUTO: 6.8 %
HCG SERPL-MCNC: 5829 MIU/ML
HCT VFR BLD CALC: 38.8 %
HGB BLD-MCNC: 13.2 G/DL
IMM GRANULOCYTES NFR BLD AUTO: 0.3 %
LYMPHOCYTES # BLD AUTO: 2.73 K/UL
LYMPHOCYTES NFR BLD AUTO: 25.7 %
MAN DIFF?: NORMAL
MCHC RBC-ENTMCNC: 30.5 PG
MCHC RBC-ENTMCNC: 34 G/DL
MCV RBC AUTO: 89.6 FL
MONOCYTES # BLD AUTO: 0.55 K/UL
MONOCYTES NFR BLD AUTO: 5.2 %
NEUTROPHILS # BLD AUTO: 6.56 K/UL
NEUTROPHILS NFR BLD AUTO: 61.6 %
PLATELET # BLD AUTO: 227 K/UL
RBC # BLD: 4.33 M/UL
RBC # FLD: 12.8 %
WBC # FLD AUTO: 10.63 K/UL

## 2025-06-13 ENCOUNTER — OUTPATIENT (OUTPATIENT)
Dept: OUTPATIENT SERVICES | Facility: HOSPITAL | Age: 35
LOS: 1 days | End: 2025-06-13

## 2025-06-13 ENCOUNTER — EMERGENCY (EMERGENCY)
Facility: HOSPITAL | Age: 35
LOS: 1 days | End: 2025-06-13
Attending: EMERGENCY MEDICINE | Admitting: EMERGENCY MEDICINE
Payer: COMMERCIAL

## 2025-06-13 ENCOUNTER — RESULT REVIEW (OUTPATIENT)
Age: 35
End: 2025-06-13

## 2025-06-13 VITALS
SYSTOLIC BLOOD PRESSURE: 116 MMHG | RESPIRATION RATE: 16 BRPM | DIASTOLIC BLOOD PRESSURE: 71 MMHG | HEART RATE: 91 BPM | TEMPERATURE: 98 F | OXYGEN SATURATION: 100 %

## 2025-06-13 VITALS
HEART RATE: 88 BPM | DIASTOLIC BLOOD PRESSURE: 75 MMHG | SYSTOLIC BLOOD PRESSURE: 104 MMHG | OXYGEN SATURATION: 100 % | HEIGHT: 61 IN | TEMPERATURE: 98 F | WEIGHT: 156.09 LBS | RESPIRATION RATE: 15 BRPM

## 2025-06-13 VITALS
HEIGHT: 61 IN | DIASTOLIC BLOOD PRESSURE: 89 MMHG | RESPIRATION RATE: 16 BRPM | OXYGEN SATURATION: 99 % | WEIGHT: 156.09 LBS | SYSTOLIC BLOOD PRESSURE: 136 MMHG | HEART RATE: 85 BPM | TEMPERATURE: 98 F

## 2025-06-13 DIAGNOSIS — Z98.890 OTHER SPECIFIED POSTPROCEDURAL STATES: Chronic | ICD-10-CM

## 2025-06-13 DIAGNOSIS — O02.1 MISSED ABORTION: ICD-10-CM

## 2025-06-13 PROBLEM — O24.419 GESTATIONAL DIABETES MELLITUS IN PREGNANCY, UNSPECIFIED CONTROL: Chronic | Status: INACTIVE | Noted: 2020-06-27 | Resolved: 2025-06-13

## 2025-06-13 LAB
ALBUMIN SERPL ELPH-MCNC: 3.6 G/DL — SIGNIFICANT CHANGE UP (ref 3.3–5)
ALP SERPL-CCNC: 49 U/L — SIGNIFICANT CHANGE UP (ref 40–120)
ALT FLD-CCNC: 11 U/L — SIGNIFICANT CHANGE UP (ref 4–33)
ANION GAP SERPL CALC-SCNC: 12 MMOL/L — SIGNIFICANT CHANGE UP (ref 7–14)
APTT BLD: 29.6 SEC — SIGNIFICANT CHANGE UP (ref 26.1–36.8)
AST SERPL-CCNC: 14 U/L — SIGNIFICANT CHANGE UP (ref 4–32)
BASOPHILS # BLD AUTO: 0.04 K/UL — SIGNIFICANT CHANGE UP (ref 0–0.2)
BASOPHILS # BLD AUTO: 0.05 K/UL — SIGNIFICANT CHANGE UP (ref 0–0.2)
BASOPHILS NFR BLD AUTO: 0.2 % — SIGNIFICANT CHANGE UP (ref 0–2)
BASOPHILS NFR BLD AUTO: 0.5 % — SIGNIFICANT CHANGE UP (ref 0–2)
BILIRUB SERPL-MCNC: <0.2 MG/DL — SIGNIFICANT CHANGE UP (ref 0.2–1.2)
BLD GP AB SCN SERPL QL: NEGATIVE — SIGNIFICANT CHANGE UP
BLD GP AB SCN SERPL QL: NEGATIVE — SIGNIFICANT CHANGE UP
BUN SERPL-MCNC: 8 MG/DL — SIGNIFICANT CHANGE UP (ref 7–23)
CALCIUM SERPL-MCNC: 8.7 MG/DL — SIGNIFICANT CHANGE UP (ref 8.4–10.5)
CHLORIDE SERPL-SCNC: 107 MMOL/L — SIGNIFICANT CHANGE UP (ref 98–107)
CO2 SERPL-SCNC: 20 MMOL/L — LOW (ref 22–31)
CREAT SERPL-MCNC: 0.54 MG/DL — SIGNIFICANT CHANGE UP (ref 0.5–1.3)
EGFR: 122 ML/MIN/1.73M2 — SIGNIFICANT CHANGE UP
EGFR: 122 ML/MIN/1.73M2 — SIGNIFICANT CHANGE UP
EOSINOPHIL # BLD AUTO: 0.27 K/UL — SIGNIFICANT CHANGE UP (ref 0–0.5)
EOSINOPHIL # BLD AUTO: 0.6 K/UL — HIGH (ref 0–0.5)
EOSINOPHIL NFR BLD AUTO: 1.6 % — SIGNIFICANT CHANGE UP (ref 0–6)
EOSINOPHIL NFR BLD AUTO: 5.6 % — SIGNIFICANT CHANGE UP (ref 0–6)
GLUCOSE SERPL-MCNC: 116 MG/DL — HIGH (ref 70–99)
HCG SERPL-ACNC: 1876 MIU/ML — SIGNIFICANT CHANGE UP
HCT VFR BLD CALC: 28.7 % — LOW (ref 34.5–45)
HCT VFR BLD CALC: 31.5 % — LOW (ref 34.5–45)
HCT VFR BLD CALC: 39.2 % — SIGNIFICANT CHANGE UP (ref 34.5–45)
HGB BLD-MCNC: 10.7 G/DL — LOW (ref 11.5–15.5)
HGB BLD-MCNC: 13.3 G/DL — SIGNIFICANT CHANGE UP (ref 11.5–15.5)
HGB BLD-MCNC: 9.6 G/DL — LOW (ref 11.5–15.5)
IANC: 13.71 K/UL — HIGH (ref 1.8–7.4)
IMM GRANULOCYTES NFR BLD AUTO: 0.4 % — SIGNIFICANT CHANGE UP (ref 0–0.9)
IMM GRANULOCYTES NFR BLD AUTO: 0.6 % — SIGNIFICANT CHANGE UP (ref 0–0.9)
INR BLD: 1.04 RATIO — SIGNIFICANT CHANGE UP (ref 0.85–1.16)
LYMPHOCYTES # BLD AUTO: 13.3 % — SIGNIFICANT CHANGE UP (ref 13–44)
LYMPHOCYTES # BLD AUTO: 2.25 K/UL — SIGNIFICANT CHANGE UP (ref 1–3.3)
LYMPHOCYTES # BLD AUTO: 2.4 K/UL — SIGNIFICANT CHANGE UP (ref 1–3.3)
LYMPHOCYTES # BLD AUTO: 22.4 % — SIGNIFICANT CHANGE UP (ref 13–44)
MCHC RBC-ENTMCNC: 29 PG — SIGNIFICANT CHANGE UP (ref 27–34)
MCHC RBC-ENTMCNC: 29.5 PG — SIGNIFICANT CHANGE UP (ref 27–34)
MCHC RBC-ENTMCNC: 29.5 PG — SIGNIFICANT CHANGE UP (ref 27–34)
MCHC RBC-ENTMCNC: 33.4 G/DL — SIGNIFICANT CHANGE UP (ref 32–36)
MCHC RBC-ENTMCNC: 33.9 G/DL — SIGNIFICANT CHANGE UP (ref 32–36)
MCHC RBC-ENTMCNC: 34 G/DL — SIGNIFICANT CHANGE UP (ref 32–36)
MCV RBC AUTO: 85.4 FL — SIGNIFICANT CHANGE UP (ref 80–100)
MCV RBC AUTO: 86.8 FL — SIGNIFICANT CHANGE UP (ref 80–100)
MCV RBC AUTO: 88.3 FL — SIGNIFICANT CHANGE UP (ref 80–100)
MONOCYTES # BLD AUTO: 0.51 K/UL — SIGNIFICANT CHANGE UP (ref 0–0.9)
MONOCYTES # BLD AUTO: 0.56 K/UL — SIGNIFICANT CHANGE UP (ref 0–0.9)
MONOCYTES NFR BLD AUTO: 3.3 % — SIGNIFICANT CHANGE UP (ref 2–14)
MONOCYTES NFR BLD AUTO: 4.8 % — SIGNIFICANT CHANGE UP (ref 2–14)
NEUTROPHILS # BLD AUTO: 13.71 K/UL — HIGH (ref 1.8–7.4)
NEUTROPHILS # BLD AUTO: 7.12 K/UL — SIGNIFICANT CHANGE UP (ref 1.8–7.4)
NEUTROPHILS NFR BLD AUTO: 66.3 % — SIGNIFICANT CHANGE UP (ref 43–77)
NEUTROPHILS NFR BLD AUTO: 81 % — HIGH (ref 43–77)
NRBC # BLD AUTO: 0 K/UL — SIGNIFICANT CHANGE UP (ref 0–0)
NRBC # BLD AUTO: 0 K/UL — SIGNIFICANT CHANGE UP (ref 0–0)
NRBC # FLD: 0 K/UL — SIGNIFICANT CHANGE UP (ref 0–0)
NRBC # FLD: 0 K/UL — SIGNIFICANT CHANGE UP (ref 0–0)
NRBC BLD AUTO-RTO: 0 /100 WBCS — SIGNIFICANT CHANGE UP (ref 0–0)
NRBC BLD AUTO-RTO: 0 /100 WBCS — SIGNIFICANT CHANGE UP (ref 0–0)
PLATELET # BLD AUTO: 187 K/UL — SIGNIFICANT CHANGE UP (ref 150–400)
PLATELET # BLD AUTO: 209 K/UL — SIGNIFICANT CHANGE UP (ref 150–400)
PLATELET # BLD AUTO: 224 K/UL — SIGNIFICANT CHANGE UP (ref 150–400)
POTASSIUM SERPL-MCNC: 4.3 MMOL/L — SIGNIFICANT CHANGE UP (ref 3.5–5.3)
POTASSIUM SERPL-SCNC: 4.3 MMOL/L — SIGNIFICANT CHANGE UP (ref 3.5–5.3)
PROT SERPL-MCNC: 6.3 G/DL — SIGNIFICANT CHANGE UP (ref 6–8.3)
PROTHROM AB SERPL-ACNC: 12.1 SEC — SIGNIFICANT CHANGE UP (ref 9.9–13.4)
RBC # BLD: 3.25 M/UL — LOW (ref 3.8–5.2)
RBC # BLD: 3.63 M/UL — LOW (ref 3.8–5.2)
RBC # BLD: 4.59 M/UL — SIGNIFICANT CHANGE UP (ref 3.8–5.2)
RBC # FLD: 12.4 % — SIGNIFICANT CHANGE UP (ref 10.3–14.5)
RBC # FLD: 12.5 % — SIGNIFICANT CHANGE UP (ref 10.3–14.5)
RBC # FLD: 12.6 % — SIGNIFICANT CHANGE UP (ref 10.3–14.5)
RH IG SCN BLD-IMP: POSITIVE — SIGNIFICANT CHANGE UP
SODIUM SERPL-SCNC: 139 MMOL/L — SIGNIFICANT CHANGE UP (ref 135–145)
WBC # BLD: 10.72 K/UL — HIGH (ref 3.8–10.5)
WBC # BLD: 12.83 K/UL — HIGH (ref 3.8–10.5)
WBC # BLD: 16.93 K/UL — HIGH (ref 3.8–10.5)
WBC # FLD AUTO: 10.72 K/UL — HIGH (ref 3.8–10.5)
WBC # FLD AUTO: 12.83 K/UL — HIGH (ref 3.8–10.5)
WBC # FLD AUTO: 16.93 K/UL — HIGH (ref 3.8–10.5)

## 2025-06-13 PROCEDURE — 76817 TRANSVAGINAL US OBSTETRIC: CPT | Mod: 26

## 2025-06-13 PROCEDURE — 99291 CRITICAL CARE FIRST HOUR: CPT

## 2025-06-13 PROCEDURE — 88305 TISSUE EXAM BY PATHOLOGIST: CPT | Mod: 26

## 2025-06-13 RX ORDER — DOXYCYCLINE HYCLATE 100 MG
100 TABLET ORAL EVERY 12 HOURS
Refills: 0 | Status: DISCONTINUED | OUTPATIENT
Start: 2025-06-13 | End: 2025-06-17

## 2025-06-13 RX ORDER — DOXYCYCLINE HYCLATE 100 MG
1 TABLET ORAL
Qty: 10 | Refills: 0
Start: 2025-06-13 | End: 2025-06-17

## 2025-06-13 RX ADMIN — Medication 100 MILLIGRAM(S): at 22:43

## 2025-06-13 RX ADMIN — Medication 0.2 MILLIGRAM(S): at 23:29

## 2025-06-13 NOTE — H&P PST ADULT - NSANTHOSAYNRD_GEN_A_CORE
No. TRE screening performed.  STOP BANG Legend: 0-2 = LOW Risk; 3-4 = INTERMEDIATE Risk; 5-8 = HIGH Risk

## 2025-06-13 NOTE — ED PROVIDER NOTE - PROGRESS NOTE DETAILS
exam chaperoned by Dr. Silvia Shine. Products of conception in the cervical os. Attempted to remove however unsuccessful. Pt HD stable. Consulted OBGYN for removal. OBGYN was able to remove at the bedside. Will send pt to US to eval for retained products of conception. Dispo pending workup.  Ilsa Ledbetter PGY1 rpt hb 9.6. Will send methergine and doxy to pharmacy per OBGYN recommendations. OBGYN to come reassess pt for potential bleeding before discharge.   Ilsa Ledbetter PGY1 Pt reassessed by OBGYN and stable for dc home. Pt is afebrile currently, vitals are stable. Pt was educated on outpatient treatment, follow up and return precautions. Shared decision making performed. Pt okay for DC home at this time. Questions answered. Pt understands and agrees with the plan for discharge home. Pt has access to appropriate follow up.   Ilsa Ledbetter PGY1

## 2025-06-13 NOTE — ED PROVIDER NOTE - PATIENT PORTAL LINK FT
You can access the FollowMyHealth Patient Portal offered by [facility] by registering at the following website: [email or web address]. By joining [employer]’s FollowMyHealth portal, you will also be able to view your health information using other applications (apps) compatible with our system.

## 2025-06-13 NOTE — ED ADULT NURSE NOTE - OBJECTIVE STATEMENT
pt arrives to ED c/o vaginal bleeding pt is approx 12 weeks pregnant and passing sonia clots  pt currently denies any pain md bailey in progress

## 2025-06-13 NOTE — CONSULT NOTE ADULT - SUBJECTIVE AND OBJECTIVE BOX
ELAINE BLACKMON  34y  Female [id number]    HPI: 34y  12w6d by LMP of 3/15 with known MAB with planned D&C on  presenting with heavy vaginal bleeding. Patient notes significant vaginal bleeding at home with using multiple pads and pull up. Patient notes bleeding improved since presentation. Denies SOB, CP, N/V. Denies lightheadedness at time of evaluation.       Name of GYN Physician: Gauri     ObHx:  x1   GynHx:Prior fibroids, polyp Denies cysts, endometriosis, STI's, Abnormal pap smears   PMHx: Denies   SurgHx: D&C polypectomy   Meds: Denies   Allergies: NKDA   Social History:  Denies smoking use, drug use, alcohol use.    Vital Signs Last 24 Hrs  T(C): 36.6 (2025 18:27), Max: 36.6 (2025 18:27)  T(F): 97.8 (2025 18:27), Max: 97.8 (2025 18:27)  HR: 91 (2025 18:27) (88 - 91)  BP: 116/71 (2025 18:27) (104/75 - 116/71)  BP(mean): --  RR: 16 (2025 18:27) (15 - 16)  SpO2: 100% (2025 18:27) (100% - 100%)    Parameters below as of 2025 18:27  Patient On (Oxygen Delivery Method): room air        Physical Exam:   General: sitting comfortably in bed, NAD   Lungs: non labored breathing on RA  Abd: Soft, non-tender, non-distended.    : 30cc on pad, External labia wnl. Bimanual exam with no cervical motion tenderness, uterus wnl, adnexa non palpable b/l.  Cervix dilated 1.5 cm   Speculum Exam: Clots and products of conception noted external cervical os. No active bleeding from os.   Ext: non-tender b/l, no edema     Chaperoned by Ilsa Ledbetter ED MD     LABS:                              9.6    12.83 )-----------( 187      ( 2025 22:40 )             28.7     06-13    139  |  107  |  8   ----------------------------<  116[H]  4.3   |  20[L]  |  0.54    Ca    8.7      2025 18:30    TPro  6.3  /  Alb  3.6  /  TBili  <0.2  /  DBili  x   /  AST  14  /  ALT  11  /  AlkPhos  49  06-13    I&O's Detail    PT/INR - ( 2025 18:30 )   PT: 12.1 sec;   INR: 1.04 ratio         PTT - ( 2025 18:30 )  PTT:29.6 sec  Urinalysis Basic - ( 2025 18:30 )    Color: x / Appearance: x / SG: x / pH: x  Gluc: 116 mg/dL / Ketone: x  / Bili: x / Urobili: x   Blood: x / Protein: x / Nitrite: x   Leuk Esterase: x / RBC: x / WBC x   Sq Epi: x / Non Sq Epi: x / Bacteria: x        RADIOLOGY & ADDITIONAL STUDIES:      < from: US Transvaginal, OB (25 @ 19:34) >    ACC: [id number] EXAM:  US OB TRANSVAGINAL   ORDERED BY: ILSA LEDBETTER     PROCEDURE DATE:  2025          INTERPRETATION:  CLINICAL INFORMATION: Early pregnancy. Vaginal bleeding    LMP: 03/15/2025 Estimated Gestational Age by LMP: 12 weeks 6 days    COMPARISON: None available.    TECHNIQUE: Endovaginal and transabdominal pelvic sonogram.    FINDINGS:  Uterus: Approximately measures 9.5 x 5.8 x 6.7 cm. No visualized   intrauterine gestation.  Distended endocervical canal measuring up to 1.6cm containing   heterogeneous echogenic material likely hemorrhagic products  Endometrium measures up to 1.3 cm at the level of the mid body.    Right ovary: Incompletely imaged 2.8 cm x 1.7 cm x 1.9 cm. Unremarkable   as visualized. Normal arterial and venous waveforms.    Left ovary: Incompletely imaged 2.9 cm x 2.1 cm x 2.3 cm. . Contains 1.5   cm probable corpus luteum Normal arterial and venous waveforms.    Fluid: None.    IMPRESSION:  Pregnancy of unknown location. Differential considerations include early   nonvisualized IUP, recent miscarriage or nonvisualized ectopic pregnancy.   Recommend correlation with serial beta-hCG and consider short interval   follow-up ultrasound in a few days.          --- End of Report ---            BO LEVY MD; Attending Radiologist  This document has been electronically signed. 2025  7:46PM    < end of copied text >

## 2025-06-13 NOTE — ED PROVIDER NOTE - PHYSICAL EXAMINATION
GENERAL: no acute distress, non-toxic appearing  GI: abdomen distended, soft, nontender  : no CVA tenderness, no suprapubic tenderness

## 2025-06-13 NOTE — CONSULT NOTE ADULT - ASSESSMENT
Assessment: 34y  12w6d by LMP of 3/15 with known MAB with planned D&C on  presenting with heavy vaginal bleeding. Likely incomplete  vs  in progress given known missed .     Recommendations:   - Pending TVUS, discussed options of MVA, D&C, Misoprostol expectant management    TVUS showing No visualized intrauterine gestation. Distended endocervical canal measuring up to 1.6cm containing heterogeneous echogenic material likely hemorrhagic products    Reassessment:~9p   -Patient seen at bedside with attending   - Repeat speculum exam with POC removed from external cervical os, no continued bleeding   - Bedside TAUS showing thin endometrial stripe, no color flow   - Repeat CBC, if stable D/C with Doxycycline 100mg bid for 5 days, Methergine 0.2mg q8 hours for two days   - Close outpatient gyn follow up        Reassessment ~11:30p  - Downtrend in CBC   - No new blood noted on pad  - Labs findings likely due to equilibriation after acute bleeding   - No acute gyn interventions at this time   - Care per ED  - Close outpatient follow up with OBGYN within 1 week    Plan per Dr. Shani Rosenthal, PGY2   **Documentation Delayed Due to Clinical Duties**  **Original Evaluation at 6:30p**

## 2025-06-13 NOTE — ED ADULT TRIAGE NOTE - CHIEF COMPLAINT QUOTE
pt approx 3 months pregnant, on  ultrasound revealed no fetal heart rate, D&C scheduled for , today started having vaginal bleeding, saturated 4 pads, endorsees abdominal pain. no medical history.

## 2025-06-13 NOTE — H&P PST ADULT - NSICDXPASTMEDICALHX_GEN_ALL_CORE_FT
PAST MEDICAL HISTORY:  Gestational diabetes metformin    Missed       PAST MEDICAL HISTORY:  Gestational diabetes     Missed       (normal spontaneous vaginal delivery)     PCOS (polycystic ovarian syndrome)

## 2025-06-13 NOTE — ED PROVIDER NOTE - ATTENDING CONTRIBUTION TO CARE
Attending note:   After face to face evaluation of this patient, I concur with above noted hx, pe, and care plan for this patient. Shine: 34-year-old female  at the 3 months pregnancy.  2 weeks ago patient was diagnosed with intrauterine demise.  Patient is scheduled for D&C on the .  Today at 1 PM patient started having crampy abdominal pain and heavy vaginal bleeding.  Patient has gone through few depends since 2:30 PM.  Patient denies feeling lightheaded or dizzy or having chest pain or shortness of breath.  However patient does appear uncomfortable.  Vital signs are stable and there is no tachycardia noted.  Patient does not have any pallor.  There is mild suprapubic tenderness.  Pelvic exam chaperoned with Dr. Ledbetter.  There is a significant amount of clot noticed in the lower pads.  There is a small amount of active bleeding.  Products of conception are visualized in the os with some mild bleeding around it.  Unable to grab the products with ring forceps or with fingers.  This is even with gentle pressure unable to grab any products.  GYN consult called.  Will check CBC.  Will hold off on ultrasound until after her products are removed.

## 2025-06-13 NOTE — ED PROVIDER NOTE - NSFOLLOWUPINSTRUCTIONS_ED_ALL_ED_FT
Thank you for coming to the Emergency Department.    You were seen today for miscarriage. We obtained lab work and imaging to help determine what was causing your symptoms.     We had our OBGYN colleagues come to evaluate you.      We sent medication to your pharmacy. Please take as prescribed.     We would like you to follow up with your primary care doctor and OBGYN within 1 week.     We recommend you take 600mg ibuprofen every 6 hours or acetaminophen 650mg every 6 hours as needed for pain. If needed, you can alternate these medications so that you take one medication every 3 hours. For instance, at noon take ibuprofen, then at 3pm take acetaminophen, then at 6pm take ibuprofen.    Please do not exceed 4,000 mg of acetaminophen during a 24 hours period.  Acetaminophen can be found in many over-the-counter cold medications as well as  opioid medications that may be given for pain.    our discharge center will call you and assist you with making the appt or you can call: Find a Physician helpline ([phone]) for assistance     PLEASE RETURN TO THE EMERGENCY DEPARTMENT and call 911 IF YOU EXPERIENCE ANY OF THE FOLLOWING SYMPTOMS: worsening vaginal bleeding soaking through more than 2 pads an hours, abdominal pain, vomiting, fever, or any other concerning symptoms. Thank you for coming to the Emergency Department.    You were seen today for miscarriage. We obtained lab work and imaging to help determine what was causing your symptoms.     We had our OBGYN colleagues come to evaluate you.      We sent medication to your pharmacy. Please take as prescribed.   Methergine 0.2 mg every 8 hours for two days  Doxycycline 100 mg twice a day for five days    We would like you to follow up with your primary care doctor and OBGYN within 1 week.     We recommend you take 600mg ibuprofen every 6 hours or acetaminophen 650mg every 6 hours as needed for pain. If needed, you can alternate these medications so that you take one medication every 3 hours. For instance, at noon take ibuprofen, then at 3pm take acetaminophen, then at 6pm take ibuprofen.    Please do not exceed 4,000 mg of acetaminophen during a 24 hours period.  Acetaminophen can be found in many over-the-counter cold medications as well as  opioid medications that may be given for pain.    our discharge center will call you and assist you with making the appt or you can call: Find a Physician helpline ([phone]) for assistance     PLEASE RETURN TO THE EMERGENCY DEPARTMENT and call 911 IF YOU EXPERIENCE ANY OF THE FOLLOWING SYMPTOMS: worsening vaginal bleeding soaking through more than 2 pads an hours, abdominal pain, vomiting, fever, or any other concerning symptoms.

## 2025-06-13 NOTE — ED PROVIDER NOTE - OBJECTIVE STATEMENT
33 y/o F  with no significant PMHx presenting today with acute onset vaginal bleeding. LMP 3/15. Pt states she is schedule for outpatient D&C next week. Pt states that around 1 pm today she started bleeding and passing large clots. Pt had to change her pads four times PTA and arrived to the ED in 3 depends. Pt endorsing back pain and mild suprapubic pain. Pt was given Tylenol and IVF in route by EMS. Pt denies fever, nausea, vomiting, dysuria.

## 2025-06-13 NOTE — H&P PST ADULT - ATTENDING COMMENTS
Agree w/ above. Reviewed risks including bleeding, infection, uterine perforation, transfusion, need for additional procedures including laparoscopy/laparotomy. All questions answered and consent signed. Will send POC for cytogenetics per pt request. Kusum ppx.    J Goldberg MD

## 2025-06-13 NOTE — H&P PST ADULT - HISTORY OF PRESENT ILLNESS
PT present in Presurgical testing for preop  evaluation  dx with Missed  for scheduled procedure dilation Vacuum curettage      34 year old , LMP 03/15/2025. 11weeks, Had  Sonogram 25 Impression: A single IUP is seen with NO cardiac activity consistent   with missed AB. Fetal pole seen measuring 9 weeks and 1 day lagging LMP dating by 15 days .    PT present in Presurgical testing for preop  evaluation  dx with Missed  for scheduled procedure dilation Vacuum curettage      34 year old hx PCOS , hx gestational Dm with 1st pregnancy LMP 03/15/2025. 11weeks gestation in accordance to LMP, Had  Sonogram 25 Impression: A single IUP is seen with NO cardiac activity consistent with missed AB. Fetal pole seen measuring 9 weeks and 1 day lagging LMP dating by 15 days .

## 2025-06-13 NOTE — ED PROVIDER NOTE - CLINICAL SUMMARY MEDICAL DECISION MAKING FREE TEXT BOX
concern for active miscarriage as visible sac protruding from cervical os. Pt HD. Will consult OBGYN for removal. Will obtainTVUS to eval for retained products of conception. Will obtain basic labs, coags, type and screen. Dispo pending OBGYN recs.

## 2025-06-13 NOTE — H&P PST ADULT - PROBLEM SELECTOR PLAN 1
Scheduled for Dilation Vacuum curettage  Preop instructions provided and patient verbalizes understanding.     CBC,, T&S results  pending

## 2025-06-13 NOTE — H&P PST ADULT - NEGATIVE GENERAL GENITOURINARY SYMPTOMS
no hematuria/no flank pain L/no flank pain R/no bladder infections/no incontinence/no dysuria/no urinary hesitancy

## 2025-06-16 ENCOUNTER — APPOINTMENT (OUTPATIENT)
Dept: OBGYN | Facility: HOSPITAL | Age: 35
End: 2025-06-16

## 2025-06-18 PROBLEM — O24.419 GESTATIONAL DIABETES MELLITUS IN PREGNANCY, UNSPECIFIED CONTROL: Chronic | Status: ACTIVE | Noted: 2025-06-13

## 2025-06-18 PROBLEM — O02.1 MISSED ABORTION: Chronic | Status: ACTIVE | Noted: 2025-06-13

## 2025-06-19 LAB — SURGICAL PATHOLOGY STUDY: SIGNIFICANT CHANGE UP

## 2025-06-23 ENCOUNTER — APPOINTMENT (OUTPATIENT)
Dept: ANTEPARTUM | Facility: CLINIC | Age: 35
End: 2025-06-23

## 2025-06-23 ENCOUNTER — APPOINTMENT (OUTPATIENT)
Dept: OBGYN | Facility: CLINIC | Age: 35
End: 2025-06-23
Payer: COMMERCIAL

## 2025-06-23 VITALS — SYSTOLIC BLOOD PRESSURE: 113 MMHG | DIASTOLIC BLOOD PRESSURE: 80 MMHG

## 2025-06-23 VITALS — DIASTOLIC BLOOD PRESSURE: 90 MMHG | WEIGHT: 158 LBS | SYSTOLIC BLOOD PRESSURE: 148 MMHG | BODY MASS INDEX: 28.9 KG/M2

## 2025-06-23 PROCEDURE — 99213 OFFICE O/P EST LOW 20 MIN: CPT

## 2025-06-23 PROCEDURE — 76830 TRANSVAGINAL US NON-OB: CPT

## 2025-06-23 PROCEDURE — 76857 US EXAM PELVIC LIMITED: CPT | Mod: 59

## 2025-06-23 PROCEDURE — 36415 COLL VENOUS BLD VENIPUNCTURE: CPT

## 2025-06-23 RX ORDER — MISOPROSTOL 200 UG/1
200 TABLET ORAL
Qty: 8 | Refills: 0 | Status: ACTIVE | COMMUNITY
Start: 2025-06-23 | End: 1900-01-01

## 2025-06-25 ENCOUNTER — APPOINTMENT (OUTPATIENT)
Dept: OBGYN | Facility: CLINIC | Age: 35
End: 2025-06-25

## 2025-06-30 LAB
BASOPHILS # BLD AUTO: 0.04 K/UL
BASOPHILS NFR BLD AUTO: 0.4 %
EOSINOPHIL # BLD AUTO: 0.7 K/UL
EOSINOPHIL NFR BLD AUTO: 6.9 %
HCG SERPL-MCNC: 247 MIU/ML
HCT VFR BLD CALC: 29.8 %
HGB BLD-MCNC: 9.5 G/DL
IMM GRANULOCYTES NFR BLD AUTO: 0.1 %
LYMPHOCYTES # BLD AUTO: 3.27 K/UL
LYMPHOCYTES NFR BLD AUTO: 32.1 %
MAN DIFF?: NORMAL
MCHC RBC-ENTMCNC: 28.6 PG
MCHC RBC-ENTMCNC: 31.9 G/DL
MCV RBC AUTO: 89.8 FL
MONOCYTES # BLD AUTO: 0.55 K/UL
MONOCYTES NFR BLD AUTO: 5.4 %
NEUTROPHILS # BLD AUTO: 5.63 K/UL
NEUTROPHILS NFR BLD AUTO: 55.1 %
PLATELET # BLD AUTO: 320 K/UL
RBC # BLD: 3.32 M/UL
RBC # FLD: 13.2 %
WBC # FLD AUTO: 10.2 K/UL

## 2025-07-07 ENCOUNTER — APPOINTMENT (OUTPATIENT)
Dept: OBGYN | Facility: CLINIC | Age: 35
End: 2025-07-07

## 2025-07-07 ENCOUNTER — ASOB RESULT (OUTPATIENT)
Age: 35
End: 2025-07-07

## 2025-07-07 VITALS — DIASTOLIC BLOOD PRESSURE: 94 MMHG | BODY MASS INDEX: 29.45 KG/M2 | WEIGHT: 161 LBS | SYSTOLIC BLOOD PRESSURE: 143 MMHG

## 2025-07-07 VITALS — SYSTOLIC BLOOD PRESSURE: 142 MMHG | DIASTOLIC BLOOD PRESSURE: 87 MMHG

## 2025-07-07 PROCEDURE — 99213 OFFICE O/P EST LOW 20 MIN: CPT

## 2025-07-09 ENCOUNTER — OUTPATIENT (OUTPATIENT)
Dept: OUTPATIENT SERVICES | Facility: HOSPITAL | Age: 35
LOS: 1 days | End: 2025-07-09
Payer: COMMERCIAL

## 2025-07-09 ENCOUNTER — TRANSCRIPTION ENCOUNTER (OUTPATIENT)
Age: 35
End: 2025-07-09

## 2025-07-09 ENCOUNTER — RESULT REVIEW (OUTPATIENT)
Age: 35
End: 2025-07-09

## 2025-07-09 VITALS
RESPIRATION RATE: 15 BRPM | OXYGEN SATURATION: 100 % | WEIGHT: 156.09 LBS | HEART RATE: 82 BPM | DIASTOLIC BLOOD PRESSURE: 86 MMHG | SYSTOLIC BLOOD PRESSURE: 132 MMHG | HEIGHT: 61 IN | TEMPERATURE: 98 F

## 2025-07-09 VITALS
DIASTOLIC BLOOD PRESSURE: 79 MMHG | RESPIRATION RATE: 18 BRPM | HEART RATE: 70 BPM | SYSTOLIC BLOOD PRESSURE: 115 MMHG | OXYGEN SATURATION: 100 %

## 2025-07-09 DIAGNOSIS — Z98.890 OTHER SPECIFIED POSTPROCEDURAL STATES: Chronic | ICD-10-CM

## 2025-07-09 PROCEDURE — 59812 TREATMENT OF MISCARRIAGE: CPT

## 2025-07-09 PROCEDURE — 88305 TISSUE EXAM BY PATHOLOGIST: CPT | Mod: 26

## 2025-07-09 NOTE — ASU DISCHARGE PLAN (ADULT/PEDIATRIC) - NURSING INSTRUCTIONS
You received IV Tylenol for pain management at _09:30 AM__. Please DO NOT take any Tylenol (Acetaminophen) containing products, such as Vicodin, Percocet, Excedrin, and cold medications for the next 6 hours (until  03:30___ PM). DO NOT TAKE MORE THAN 3000 MG OF TYLENOL in a 24 hour period.   You received IV Toradol for pain management at _09:30 AM__. Please DO NOT take Motrin/Ibuprofen/Advil/Aleve/NSAIDs (Non-Steroidal Anti-Inflammatory Drugs) for the next 6 hours (until _03:30__ PM).

## 2025-07-09 NOTE — BRIEF OPERATIVE NOTE - NSICDXBRIEFPOSTOP_GEN_ALL_CORE_FT
POST-OP DIAGNOSIS:  Retained products of conception after miscarriage 09-Jul-2025 09:45:20  Domenica Swartz

## 2025-07-09 NOTE — ASU DISCHARGE PLAN (ADULT/PEDIATRIC) - CARE PROVIDER_API CALL
Don Astorga  Maternal and Fetal Medicine  [address], [address]  [place] [address]  Phone: [phone]  Fax: [phone]  Follow Up Time: 2 weeks

## 2025-07-09 NOTE — BRIEF OPERATIVE NOTE - NSICDXBRIEFPREOP_GEN_ALL_CORE_FT
PRE-OP DIAGNOSIS:  Retained products of conception after miscarriage 09-Jul-2025 09:45:09  Domenica Swartz

## 2025-07-09 NOTE — BRIEF OPERATIVE NOTE - NSICDXBRIEFPROCEDURE_GEN_ALL_CORE_FT
PROCEDURES:  Dilation and curettage, uterus, using suction, with US guidance 09-Jul-2025 09:44:56  Domenica Swartz

## 2025-07-09 NOTE — ASU DISCHARGE PLAN (ADULT/PEDIATRIC) - FINANCIAL ASSISTANCE
[facility] provides services at a reduced cost to those who are determined to be eligible through [facility]’s financial assistance program. Information regarding [facility]’s financial assistance program can be found by going to [email or web address] or by calling [phone].

## 2025-07-09 NOTE — ASU DISCHARGE PLAN (ADULT/PEDIATRIC) - ASU DC SPECIAL INSTRUCTIONSFT
Regular diet as tolerated, regular activity as tolerated.  Nothing per vagina: no intercourse, tampons or douching.  Call your provider if you experience fevers, chills, worsening abdominal pain, inability to urinate or worsening vaginal bleeding.  Follow up with your provider in 2 weeks.

## 2025-07-09 NOTE — H&P PST ADULT - ASSESSMENT
34 year old hx PCOS , hx gestational Dm with 1st pregnancy LMP 03/15/2025. Had SAB  with passage of products, now with continued vaginal bleeding and elevated bHCG. Scheduled for suction dilation and curettage     KODAK Craven  d/w Dr. Astorga

## 2025-07-09 NOTE — H&P PST ADULT - HISTORY OF PRESENT ILLNESS
34 year old hx PCOS , hx gestational Dm with 1st pregnancy LMP 03/15/2025. Had SAB , now with continued vaginal bleeding and elevated bHCG. Scheduled for DVC.

## 2025-07-12 LAB — SURGICAL PATHOLOGY STUDY: SIGNIFICANT CHANGE UP

## 2025-07-13 LAB — HCG SERPL-MCNC: 95 MIU/ML

## 2025-07-23 ENCOUNTER — ASOB RESULT (OUTPATIENT)
Age: 35
End: 2025-07-23

## 2025-07-23 ENCOUNTER — APPOINTMENT (OUTPATIENT)
Dept: ANTEPARTUM | Facility: CLINIC | Age: 35
End: 2025-07-23
Payer: COMMERCIAL

## 2025-07-23 ENCOUNTER — APPOINTMENT (OUTPATIENT)
Dept: OBGYN | Facility: CLINIC | Age: 35
End: 2025-07-23
Payer: COMMERCIAL

## 2025-07-23 VITALS
WEIGHT: 162 LBS | SYSTOLIC BLOOD PRESSURE: 138 MMHG | DIASTOLIC BLOOD PRESSURE: 88 MMHG | HEIGHT: 62 IN | BODY MASS INDEX: 29.81 KG/M2

## 2025-07-23 DIAGNOSIS — O02.1 MISSED ABORTION: ICD-10-CM

## 2025-07-23 PROCEDURE — 76817 TRANSVAGINAL US OBSTETRIC: CPT

## 2025-07-23 PROCEDURE — 99024 POSTOP FOLLOW-UP VISIT: CPT

## 2025-07-27 LAB — HCG SERPL-MCNC: 10 MIU/ML

## (undated) DEVICE — DRAPE TOWEL BLUE 17" X 24"

## (undated) DEVICE — GOWN LG

## (undated) DEVICE — VISITEC 4X4

## (undated) DEVICE — TUBING IRR SET FOR CYSTOSCOPY 77"

## (undated) DEVICE — GOWN XL

## (undated) DEVICE — TUBING SUCTION NONCONDUCTIVE 6MM X 12FT

## (undated) DEVICE — PROTECTOR HEEL / ELBOW FLUFFY

## (undated) DEVICE — VACUUM CURETTE BUSSE HOSP CURVED 8MM

## (undated) DEVICE — POSITIONER STRAP ARMBOARD VELCRO TS-30

## (undated) DEVICE — DRAPE LIGHT HANDLE COVER (GREEN)

## (undated) DEVICE — DRAPE GYN IRRIGATION POUCH 19 X 23"

## (undated) DEVICE — DRSG TELFA 3 X 8

## (undated) DEVICE — SOL IRR POUR NS 0.9% 1000ML

## (undated) DEVICE — PREP BETADINE KIT

## (undated) DEVICE — LABELS BLANK W PEN

## (undated) DEVICE — BASIN SET SINGLE

## (undated) DEVICE — Device

## (undated) DEVICE — SOL IRR POUR H2O 500ML

## (undated) DEVICE — PRESSURE INFUSOR BAG 1000ML

## (undated) DEVICE — PACK D&C

## (undated) DEVICE — VENODYNE/SCD SLEEVE CALF MEDIUM